# Patient Record
Sex: FEMALE | Race: WHITE | NOT HISPANIC OR LATINO | Employment: UNEMPLOYED | URBAN - METROPOLITAN AREA
[De-identification: names, ages, dates, MRNs, and addresses within clinical notes are randomized per-mention and may not be internally consistent; named-entity substitution may affect disease eponyms.]

---

## 2017-03-14 ENCOUNTER — HOSPITAL ENCOUNTER (EMERGENCY)
Facility: OTHER | Age: 27
Discharge: HOME | End: 2017-03-14
Attending: EMERGENCY MEDICINE
Payer: MEDICAID

## 2017-03-14 VITALS
HEART RATE: 90 BPM | RESPIRATION RATE: 18 BRPM | HEIGHT: 63 IN | TEMPERATURE: 98 F | DIASTOLIC BLOOD PRESSURE: 66 MMHG | BODY MASS INDEX: 25.34 KG/M2 | OXYGEN SATURATION: 98 % | SYSTOLIC BLOOD PRESSURE: 116 MMHG | WEIGHT: 143 LBS

## 2017-03-14 DIAGNOSIS — F41.1 ANXIETY REACTION: Primary | ICD-10-CM

## 2017-03-14 LAB
AMPHET+METHAMPHET UR QL: NEGATIVE
B-HCG UR QL: NEGATIVE
BARBITURATES UR QL SCN>200 NG/ML: NEGATIVE
BENZODIAZ UR QL SCN>200 NG/ML: NEGATIVE
BZE UR QL SCN: NEGATIVE
CANNABINOIDS UR QL SCN: NORMAL
CREAT UR-MCNC: 77.6 MG/DL
CTP QC/QA: YES
ETHANOL UR-MCNC: <10 MG/DL
METHADONE UR QL SCN>300 NG/ML: NEGATIVE
OPIATES UR QL SCN: NEGATIVE
PCP UR QL SCN>25 NG/ML: NEGATIVE
TOXICOLOGY INFORMATION: NORMAL

## 2017-03-14 PROCEDURE — 93010 ELECTROCARDIOGRAM REPORT: CPT | Mod: ,,, | Performed by: INTERNAL MEDICINE

## 2017-03-14 PROCEDURE — 81025 URINE PREGNANCY TEST: CPT | Performed by: PHYSICIAN ASSISTANT

## 2017-03-14 PROCEDURE — 80307 DRUG TEST PRSMV CHEM ANLYZR: CPT

## 2017-03-14 PROCEDURE — 25000003 PHARM REV CODE 250: Performed by: PHYSICIAN ASSISTANT

## 2017-03-14 PROCEDURE — 99284 EMERGENCY DEPT VISIT MOD MDM: CPT | Mod: 25

## 2017-03-14 RX ORDER — FLUOXETINE HYDROCHLORIDE 20 MG/1
40 CAPSULE ORAL DAILY
COMMUNITY
End: 2018-01-26

## 2017-03-14 RX ORDER — LORAZEPAM 0.5 MG/1
0.5 TABLET ORAL
Status: COMPLETED | OUTPATIENT
Start: 2017-03-14 | End: 2017-03-14

## 2017-03-14 RX ORDER — DIVALPROEX SODIUM 500 MG/1
500 TABLET, FILM COATED, EXTENDED RELEASE ORAL DAILY
COMMUNITY
End: 2018-01-26

## 2017-03-14 RX ORDER — GABAPENTIN 600 MG/1
600 TABLET ORAL 3 TIMES DAILY
COMMUNITY
End: 2018-01-26

## 2017-03-14 RX ORDER — BUPROPION HYDROCHLORIDE 150 MG/1
150 TABLET, EXTENDED RELEASE ORAL 2 TIMES DAILY
COMMUNITY
End: 2018-01-26

## 2017-03-14 RX ORDER — MIRTAZAPINE 15 MG/1
30 TABLET, FILM COATED ORAL NIGHTLY
COMMUNITY
End: 2018-01-26

## 2017-03-14 RX ADMIN — LORAZEPAM 0.5 MG: 0.5 TABLET ORAL at 10:03

## 2017-03-14 NOTE — ED AVS SNAPSHOT
[facility]  [address]  [facility] [address]               Kay CLINTON [place]   3/14/2017  9:41 AM   ED    Description:  Female : 1990   Department:  [facility]           Your Care was Coordinated By:     Provider Role From To    Sidney Spivey MD Attending Provider [id number] --    Shante Leon PA-C Physician Assistant 1744 --      Reason for Visit     Anxiety           Diagnoses this Visit        Comments    Anxiety reaction    -  Primary       ED Disposition     None           To Do List           Follow-up Information     Follow up with Ama Hernandes MD In 1 day.    Specialty:  Psychiatry    Why:  To establish outpatient care    Contact information:    [address] [place] [id number] [phone]          Follow up with [facility] In 1 day.    Specialties:  Psychiatry, Behavioral Health    Why:  To establish outpatient care    Contact information:    [address] [facility] [address]  [facility] [address]  [phone]        [facility] Call     [facility] -  Assistance  Registered nurses in the [facility] provide clinical advisement, health education, appointment booking, and other advisory services.  Call for this free service at [phone].             Medications           Message regarding Medications     Verify the changes and/or additions to your medication regime listed below are the same as discussed with your clinician today.  If any of these changes or additions are incorrect, please notify your healthcare provider.        These medications were administered today        Dose Freq    lorazepam tablet 0.5 mg 0.5 mg ED 1 Time    Sig: Take 1 tablet (0.5 mg total) by mouth ED 1 Time.    Class: Normal    Route: Oral      STOP taking these medications     hydrOXYzine HCl (ATARAX) 50 MG tablet Take 2 tablets (100 mg total) by mouth every evening.    citalopram  "(CELEXA) 20 MG tablet Take 1 tablet (20 mg total) by mouth once daily.    divalproex (DEPAKOTE) 500 MG TbEC Take 1 tablet (500 mg total) by mouth every 12 (twelve) hours.    gabapentin (NEURONTIN) 400 MG capsule Take 2 capsules (800 mg total) by mouth 3 (three) times daily.           Verify that the below list of medications is an accurate representation of the medications you are currently taking.  If none reported, the list may be blank. If incorrect, please contact your healthcare provider. Carry this list with you in case of emergency.           Current Medications     buPROPion (WELLBUTRIN SR) 150 MG TBSR 12 hr tablet Take 150 mg by mouth 2 (two) times daily.    divalproex (DEPAKOTE) 500 MG Tb24 Take 500 mg by mouth once daily.    fluoxetine (PROZAC) 20 MG capsule Take 20 mg by mouth once daily.    gabapentin (NEURONTIN) 600 MG tablet Take 600 mg by mouth 3 (three) times daily.    mirtazapine (REMERON) 15 MG tablet Take 15 mg by mouth every evening.           Clinical Reference Information           Your Vitals Were     BP Pulse Temp Resp Height Weight    139/83 (BP Location: Left arm, Patient Position: Sitting) 106 98.3 °F (36.8 °C) (Oral) 22 5' 3" (1.6 m) 64.9 kg (143 lb)    Last Period SpO2 BMI          02/19/2017 (Approximate) 96% 25.33 kg/m2        Allergies as of 3/14/2017        Reactions    Toradol [Ketorolac] Nausea And Vomiting    Naproxen Nausea And Vomiting, Rash      Immunizations Administered on Date of Encounter - 3/14/2017     None      ED Micro, Lab, POCT     Start Ordered       Status Ordering Provider    03/14/17 1004 03/14/17 1003  POCT urine pregnancy  Once      Final result     03/14/17 0951 03/14/17 0952  Toxicology screen, urine  Once      Final result       ED Imaging Orders     None      Discharge References/Attachments     ANXIETY REACTION (ENGLISH)       [facility] complies with applicable Federal civil rights laws and does not discriminate on the basis of race, " color, national origin, age, disability, or sex.        Language Assistance Services     ATTENTION: Language assistance services are available, free of charge. Please call [phone].      ATENCIÓN: Si habla ashok, tiene a simpson disposición servicios gratuitos de asistencia lingüística. Llame al [phone].     CHÚ Ý: N?u b?n nói Ti?ng Vi?t, có các d?ch v? h? tr? ngôn ng? mi?n phí dành cho b?n. G?i s? [phone].

## 2017-03-14 NOTE — ED PROVIDER NOTES
"Encounter Date: 3/14/2017       History     Chief Complaint   Patient presents with    Anxiety     patient was discharged yesterday from [place] and states her anxiety is uncontrollable and states "I feel like I am going crazy and my heart is beating so fast". Denies SI/HI at this time     Review of patient's allergies indicates:   Allergen Reactions    Toradol [ketorolac] Nausea And Vomiting    Naproxen Nausea And Vomiting and Rash     HPI Comments: Patient is 26-year-old female who presents with complaints of anxiety and chest tightness with fast beating heart.  Patient has a history of bipolar disorder, PTSD, anxiety, depression, drug abuse and dependence, hepatitis C. she admits recently being discharged from [place] psychiatric facility yesterday for suicidal ideations.  Currently she adamantly denies suicidal or homicidal ideation but reports having a panic attack upon discharge yesterday.  She reports going to [facility] where she received a Benadryl shot only and was not given any outpatient planning for psychiatric follow-up.  She currently denies having a psychiatrist in [place].  [facility] the [address] and is presenting to the ER today for another panic attack.  She describes anxiousness, shaking, uncontrollable crying, chest tightness.  She denies taking any medications apart from her prescribed medications.  She denies drug or alcohol.  Currently unaccompanied in the ER.  Of note, also denies auditory or visual hallucinations.    The history is provided by the patient.     Past Medical History:   Diagnosis Date    Allergy     Anxiety     Asthma     Bipolar 1 disorder     Cyst of left ovary 2012    pelvic pain    Depression     Drug abuse and dependence     Endometriosis     Epistaxis     rare, happens when she is hot, in humid weather    Headache(784.0)     HEARING LOSS     Hepatitis C     History of psychiatric hospitalization     Lung " disease     Multiple body piercings     nose, ears, lip    Pelvic pain in female     PTSD (post-traumatic stress disorder)     Smoker      Past Surgical History:   Procedure Laterality Date    endometriosis      PELVIC LAPAROSCOPY  02/2011 & 10/2011    VAGINAL DELIVERY      times 2     Family History   Problem Relation Age of Onset    COPD Mother     Hypertension Mother     Ovarian cancer Mother     Cancer Mother      uterine    COPD Father     Hypertension Father     Asthma Sister     Cancer Maternal Grandmother      uterine    Ovarian cancer Maternal Grandmother     Ovarian cancer Paternal Aunt     Breast cancer Neg Hx      Social History   Substance Use Topics    Smoking status: Current Every Day Smoker     Packs/day: 0.50     Years: 10.00     Types: Cigarettes    Smokeless tobacco: Never Used    Alcohol use Yes      Comment: socially     Review of Systems   Constitutional: Negative for chills and fever.   HENT: Negative for sore throat and trouble swallowing.    Eyes: Negative for visual disturbance.   Respiratory: Negative for cough and shortness of breath.    Cardiovascular: Negative for chest pain.   Gastrointestinal: Negative for abdominal pain, constipation, diarrhea, nausea and vomiting.   Genitourinary: Negative for dysuria and flank pain.   Musculoskeletal: Negative for back pain, neck pain and neck stiffness.   Skin: Negative for rash.   Neurological: Negative for dizziness, syncope, weakness and headaches.   Psychiatric/Behavioral: Negative for confusion.        Anxiety         Physical Exam   Initial Vitals   BP Pulse Resp Temp SpO2   03/14/17 0939 03/14/17 0939 03/14/17 0939 03/14/17 0939 03/14/17 0939   139/83 106 22 98.3 °F (36.8 °C) 96 %     Physical Exam    Constitutional: She appears well-developed and well-nourished.   Healthy appearing  female in moderate distress crying uncontrollably during interview and exam.  She is able to be consoled when asking about  history of present illness.  She is very tearful.  She does make good eye contact and speaks in clear full sentences.  She is able to ambulate with ease.  She is noted to have her resting tremor likely related to anxiety.   HENT:   Head: Normocephalic and atraumatic.   Eyes: Conjunctivae and EOM are normal. Pupils are equal, round, and reactive to light. Right eye exhibits no discharge. Left eye exhibits no discharge. No scleral icterus.   Neck: Normal range of motion. Neck supple.   Cardiovascular: Normal rate, regular rhythm and normal heart sounds. Exam reveals no gallop and no friction rub.    No murmur heard.  Pulmonary/Chest: Breath sounds normal. She has no wheezes. She has no rhonchi. She has no rales.   Abdominal: Soft. Bowel sounds are normal. There is no tenderness. There is no rebound and no guarding.   Musculoskeletal: Normal range of motion. She exhibits no edema or tenderness.   Lymphadenopathy:     She has no cervical adenopathy.   Neurological: She is alert and oriented to person, place, and time. She has normal strength. No cranial nerve deficit or sensory deficit.   Skin: Skin is warm and dry. No rash and no abscess noted. No erythema.   Skin is flushed she is not diaphoretic   Psychiatric: She has a normal mood and affect. Her behavior is normal. Thought content normal.         ED Course   Procedures  Labs Reviewed   TOXICOLOGY SCREEN, URINE, RANDOM (COMPLIANCE)     EKG Readings: (Independently Interpreted)   Previous EKG: Compared with most recent EKG Rhythm: Normal Sinus Rhythm. Heart Rate: 96. ST Segments: Normal ST Segments. T Waves: Normal. Axis: Normal.          Medical Decision Making:   ED Management:  Urgent evaluation a 26-year-old female who presents with complaints of anxiety.  Patient is afebrile, nontoxic appearing, hemodynamically stable.  Physical exam outlined above and reveals mild tachycardia, flushed skin, resting tremor and very tearful patient.  She does have dilated pupils  "which is concerning for drug intoxication.  UDS pending.  EKG pending.  We'll reevaluate.  The patient is NOT gravely disabled at this time no PEC felt warranted.    UDS is only trace positive for THC, EKG has NSR with HR of 96 BPM will give single dose of ativan here in the ED and discharge with communicty resources for outpatient psychiatric follow-up.     UPDATE: patient reports feeling much better and clinically feels much "calmer". She is felt safe for discharge with strict instruction to follow-up with outpatient psychiatry ASAP. She is amenable. Case discussed with attending who agrees with plan.                    ED Course     Clinical Impression:   The encounter diagnosis was Anxiety reaction.          Shante Leon PA-C  03/14/17 1111    "

## 2017-03-14 NOTE — ED TRIAGE NOTES
Pt discharged from [place] yesterday, admitted for SI. Pt denies SI at present. Pt c/o increased anxiety. She reports that her ex  has not allowed her to visit her children & this is a great source of stress. Pt reports that she has been taking prozac & wellbutrin with no relief anxiety relief. Pt crying, reports that she has been using breathing techniques in addition to medications with no relief. Pt went to [facility] yesterday & given a benadryl shot which provided minimal relief. Pt reports that she has minimal resources & support. [facility].

## 2017-03-14 NOTE — ED NOTES
Patient Identifiers for Kay CLINTON [place] checked and correct  LOC: The patient is awake, alert and aware of environment with an appropriate affect, the patient is oriented x 3 and speaking appropriate.  APPEARANCE: Pt upset, anxious & crying. Pt is in no acute distress. The patient is clean and well groomed. The patient's clothing is properly fastened.  SKIN: The skin is warm and dry. The patient has normal skin turgor and moist mucus membranes. No rashes or lesions upon observation. Skin Intact , no breakdown noted.  Musculoskeletal :  Normal range of motion noted. Moves all extremities well.  RESPIRATORY: Airway is open and patent, respirations are spontaneous, patient has a normal effort and rate. Breath sounds are clear & equal, bilaterally.  CARDIAC: Patient has a normal rate and rhythm, no peripheral edema noted, capillary refill < 3 seconds.   PULSES: 2+ radial pulses, symmetrical.    Will continue to monitor

## 2018-01-26 PROBLEM — F19.11 HISTORY OF SUBSTANCE ABUSE: Status: ACTIVE | Noted: 2018-01-26

## 2018-01-26 PROBLEM — Z72.0 TOBACCO ABUSE: Status: ACTIVE | Noted: 2018-01-26

## 2018-01-30 PROBLEM — B19.20 HEPATITIS C: Status: ACTIVE | Noted: 2018-01-30

## 2018-03-04 ENCOUNTER — NURSE TRIAGE (OUTPATIENT)
Dept: ADMINISTRATIVE | Facility: CLINIC | Age: 28
End: 2018-03-04

## 2018-03-04 NOTE — TELEPHONE ENCOUNTER
"  Reason for Disposition   MODERATE-SEVERE abdominal pain (e.g., interferes with normal activities, awakens from sleep)    Answer Assessment - Initial Assessment Questions  1. LOCATION: "Where does it hurt?"       Lower back and stomac  2. RADIATION: "Does the pain shoot anywhere else?" (e.g., chest, back, shoulder)      Down legs  3. ONSET: "When did the pain begin?" (e.g., minutes, hours or days ago)       Not to long ago  4. ONSET: "Gradual or sudden onset?"      30 minutes ago  5. PATTERN "Does the pain come and go, or has it been constant since it started?"       steady  6. SEVERITY: "How bad is the pain?" "What does it keep you from doing?"  (e.g., Scale 1-10; mild, moderate, or severe)    - MILD (1-3): doesn't interfere with normal activities, abdomen soft and not tender to touch     - MODERATE (4-7): interferes with normal activities or awakens from sleep, tender to touch     - SEVERE (8-10): excruciating pain, doubled over, unable to do any normal activities      6-7  7. RECURRENT SYMPTOM: "Have you ever had this type of abdominal pain before?" If so, ask: "When was the last time?" and "What happened that time?"       Yes with previous pregn  8. CAUSE: "What do you think is causing the abdominal pain?"      permature labor  9. RELIEVING/AGGRAVATING FACTORS: "What makes it better or worse?" (e.g., antacids, bowel movement, movement)      no  10. OTHER SYMPTOMS: "Has there been any vaginal bleeding, fever, vomiting, diarrhea, or urine problems?"        nauseated  11. YOGI: "What date are you expecting to deliver?"        911    Protocols used: ST PREGNANCY - ABDOMINAL PAIN LESS THAN 20 WEEKS EGA-A-    "

## 2018-04-30 ENCOUNTER — HOSPITAL ENCOUNTER (OUTPATIENT)
Facility: HOSPITAL | Age: 28
Discharge: HOME | End: 2018-05-02
Attending: EMERGENCY MEDICINE | Admitting: SURGERY
Payer: MEDICAID

## 2018-04-30 ENCOUNTER — NURSE TRIAGE (OUTPATIENT)
Dept: ADMINISTRATIVE | Facility: CLINIC | Age: 28
End: 2018-04-30

## 2018-04-30 DIAGNOSIS — R10.9 RIGHT SIDED ABDOMINAL PAIN: Primary | ICD-10-CM

## 2018-04-30 DIAGNOSIS — R10.84 GENERALIZED ABDOMINAL PAIN: ICD-10-CM

## 2018-04-30 DIAGNOSIS — R11.2 NON-INTRACTABLE VOMITING WITH NAUSEA, UNSPECIFIED VOMITING TYPE: ICD-10-CM

## 2018-04-30 DIAGNOSIS — Z3A.19 19 WEEKS GESTATION OF PREGNANCY: ICD-10-CM

## 2018-04-30 PROCEDURE — 81003 URINALYSIS AUTO W/O SCOPE: CPT

## 2018-04-30 PROCEDURE — 99285 EMERGENCY DEPT VISIT HI MDM: CPT | Mod: 25

## 2018-04-30 PROCEDURE — 25000003 PHARM REV CODE 250: Performed by: PHYSICIAN ASSISTANT

## 2018-04-30 RX ORDER — ACETAMINOPHEN 325 MG/1
325 TABLET ORAL
Status: COMPLETED | OUTPATIENT
Start: 2018-04-30 | End: 2018-04-30

## 2018-04-30 RX ORDER — ONDANSETRON 4 MG/1
4 TABLET, ORALLY DISINTEGRATING ORAL
Status: COMPLETED | OUTPATIENT
Start: 2018-04-30 | End: 2018-04-30

## 2018-04-30 RX ADMIN — ONDANSETRON 4 MG: 4 TABLET, ORALLY DISINTEGRATING ORAL at 11:04

## 2018-04-30 RX ADMIN — ACETAMINOPHEN 325 MG: 325 TABLET ORAL at 11:04

## 2018-05-01 PROBLEM — R10.9 RIGHT SIDED ABDOMINAL PAIN: Status: ACTIVE | Noted: 2018-05-01

## 2018-05-01 LAB
ALBUMIN SERPL BCP-MCNC: 3.3 G/DL
ALP SERPL-CCNC: 60 U/L
ALT SERPL W/O P-5'-P-CCNC: 41 U/L
ANION GAP SERPL CALC-SCNC: 9 MMOL/L
AST SERPL-CCNC: 35 U/L
BASOPHILS # BLD AUTO: 0.02 K/UL
BASOPHILS NFR BLD: 0.2 %
BILIRUB SERPL-MCNC: 0.3 MG/DL
BILIRUB UR QL STRIP: NEGATIVE
BUN SERPL-MCNC: 7 MG/DL
CALCIUM SERPL-MCNC: 9.2 MG/DL
CHLORIDE SERPL-SCNC: 106 MMOL/L
CLARITY UR: CLEAR
CO2 SERPL-SCNC: 20 MMOL/L
COLOR UR: NORMAL
CREAT SERPL-MCNC: 0.7 MG/DL
DIFFERENTIAL METHOD: ABNORMAL
EOSINOPHIL # BLD AUTO: 0.2 K/UL
EOSINOPHIL NFR BLD: 1.3 %
ERYTHROCYTE [DISTWIDTH] IN BLOOD BY AUTOMATED COUNT: 13.4 %
EST. GFR  (AFRICAN AMERICAN): >60 ML/MIN/1.73 M^2
EST. GFR  (NON AFRICAN AMERICAN): >60 ML/MIN/1.73 M^2
GLUCOSE SERPL-MCNC: 90 MG/DL
GLUCOSE UR QL STRIP: NEGATIVE
HCT VFR BLD AUTO: 38.2 %
HGB BLD-MCNC: 13.2 G/DL
HGB UR QL STRIP: NEGATIVE
KETONES UR QL STRIP: NEGATIVE
LEUKOCYTE ESTERASE UR QL STRIP: NEGATIVE
LIPASE SERPL-CCNC: 16 U/L
LYMPHOCYTES # BLD AUTO: 3.1 K/UL
LYMPHOCYTES NFR BLD: 24.7 %
MCH RBC QN AUTO: 32 PG
MCHC RBC AUTO-ENTMCNC: 34.6 G/DL
MCV RBC AUTO: 93 FL
MONOCYTES # BLD AUTO: 0.9 K/UL
MONOCYTES NFR BLD: 6.9 %
NEUTROPHILS # BLD AUTO: 8.5 K/UL
NEUTROPHILS NFR BLD: 66.9 %
NITRITE UR QL STRIP: NEGATIVE
PH UR STRIP: 6 [PH] (ref 5–8)
PLATELET # BLD AUTO: 294 K/UL
PMV BLD AUTO: 10.8 FL
POTASSIUM SERPL-SCNC: 3.4 MMOL/L
PROT SERPL-MCNC: 6.5 G/DL
PROT UR QL STRIP: NEGATIVE
RBC # BLD AUTO: 4.13 M/UL
SODIUM SERPL-SCNC: 135 MMOL/L
SP GR UR STRIP: 1 (ref 1–1.03)
URN SPEC COLLECT METH UR: NORMAL
UROBILINOGEN UR STRIP-ACNC: NEGATIVE EU/DL
WBC # BLD AUTO: 12.66 K/UL

## 2018-05-01 PROCEDURE — 63600175 PHARM REV CODE 636 W HCPCS: Performed by: PHYSICIAN ASSISTANT

## 2018-05-01 PROCEDURE — G0378 HOSPITAL OBSERVATION PER HR: HCPCS

## 2018-05-01 PROCEDURE — 25000003 PHARM REV CODE 250: Performed by: PHYSICIAN ASSISTANT

## 2018-05-01 PROCEDURE — 96374 THER/PROPH/DIAG INJ IV PUSH: CPT

## 2018-05-01 PROCEDURE — 85025 COMPLETE CBC W/AUTO DIFF WBC: CPT

## 2018-05-01 PROCEDURE — 83690 ASSAY OF LIPASE: CPT

## 2018-05-01 PROCEDURE — 80053 COMPREHEN METABOLIC PANEL: CPT

## 2018-05-01 PROCEDURE — 25000003 PHARM REV CODE 250: Performed by: SURGERY

## 2018-05-01 PROCEDURE — 63600175 PHARM REV CODE 636 W HCPCS: Performed by: SURGERY

## 2018-05-01 RX ORDER — HYDROCODONE BITARTRATE AND ACETAMINOPHEN 5; 325 MG/1; MG/1
1 TABLET ORAL EVERY 4 HOURS PRN
Status: DISCONTINUED | OUTPATIENT
Start: 2018-05-01 | End: 2018-05-02 | Stop reason: HOSPADM

## 2018-05-01 RX ORDER — HYDROCODONE BITARTRATE AND ACETAMINOPHEN 5; 325 MG/1; MG/1
1 TABLET ORAL
Status: COMPLETED | OUTPATIENT
Start: 2018-05-01 | End: 2018-05-01

## 2018-05-01 RX ORDER — ONDANSETRON 4 MG/1
4 TABLET, ORALLY DISINTEGRATING ORAL EVERY 6 HOURS PRN
Status: DISCONTINUED | OUTPATIENT
Start: 2018-05-01 | End: 2018-05-02 | Stop reason: HOSPADM

## 2018-05-01 RX ORDER — PRENATAL WITH FERROUS FUM AND FOLIC ACID 3080; 920; 120; 400; 22; 1.84; 3; 20; 10; 1; 12; 200; 27; 25; 2 [IU]/1; [IU]/1; MG/1; [IU]/1; MG/1; MG/1; MG/1; MG/1; MG/1; MG/1; UG/1; MG/1; MG/1; MG/1; MG/1
1 TABLET ORAL DAILY
Status: DISCONTINUED | OUTPATIENT
Start: 2018-05-01 | End: 2018-05-02 | Stop reason: HOSPADM

## 2018-05-01 RX ORDER — METOCLOPRAMIDE HYDROCHLORIDE 5 MG/ML
10 INJECTION INTRAMUSCULAR; INTRAVENOUS
Status: COMPLETED | OUTPATIENT
Start: 2018-05-01 | End: 2018-05-01

## 2018-05-01 RX ORDER — ACETAMINOPHEN 325 MG/1
650 TABLET ORAL EVERY 8 HOURS PRN
Status: DISCONTINUED | OUTPATIENT
Start: 2018-05-01 | End: 2018-05-02 | Stop reason: HOSPADM

## 2018-05-01 RX ORDER — SODIUM CHLORIDE 9 MG/ML
1000 INJECTION, SOLUTION INTRAVENOUS
Status: COMPLETED | OUTPATIENT
Start: 2018-05-01 | End: 2018-05-01

## 2018-05-01 RX ADMIN — METOCLOPRAMIDE 10 MG: 5 INJECTION, SOLUTION INTRAMUSCULAR; INTRAVENOUS at 01:05

## 2018-05-01 RX ADMIN — HYDROCODONE BITARTRATE AND ACETAMINOPHEN 1 TABLET: 5; 325 TABLET ORAL at 02:05

## 2018-05-01 RX ADMIN — PIPERACILLIN AND TAZOBACTAM 4.5 G: 4; .5 INJECTION, POWDER, LYOPHILIZED, FOR SOLUTION INTRAVENOUS; PARENTERAL at 10:05

## 2018-05-01 RX ADMIN — HYDROCODONE BITARTRATE AND ACETAMINOPHEN 1 TABLET: 5; 325 TABLET ORAL at 08:05

## 2018-05-01 RX ADMIN — HYDROCODONE BITARTRATE AND ACETAMINOPHEN 1 TABLET: 5; 325 TABLET ORAL at 03:05

## 2018-05-01 RX ADMIN — SODIUM CHLORIDE 1000 ML: 0.9 INJECTION, SOLUTION INTRAVENOUS at 05:05

## 2018-05-01 RX ADMIN — HYDROCODONE BITARTRATE AND ACETAMINOPHEN 1 TABLET: 5; 325 TABLET ORAL at 07:05

## 2018-05-01 RX ADMIN — PROMETHAZINE HYDROCHLORIDE 6.25 MG: 25 INJECTION INTRAMUSCULAR; INTRAVENOUS at 08:05

## 2018-05-01 RX ADMIN — ONDANSETRON 4 MG: 4 TABLET, ORALLY DISINTEGRATING ORAL at 03:05

## 2018-05-01 RX ADMIN — PIPERACILLIN AND TAZOBACTAM 4.5 G: 4; .5 INJECTION, POWDER, LYOPHILIZED, FOR SOLUTION INTRAVENOUS; PARENTERAL at 03:05

## 2018-05-01 RX ADMIN — ONDANSETRON 4 MG: 4 TABLET, ORALLY DISINTEGRATING ORAL at 07:05

## 2018-05-01 NOTE — ED TRIAGE NOTES
Arrived to ED per EMS with c/o RUQ abd pain that radiates into side and under right shoulder blade x 2 days that has progressively became worse.  Also, c/o diarrhea x 2 months.  Reports that she is 19 weeks and 3 days pregnant with expected date of delivery 09/21/2018.  Reports that she also has a vaginal discharge of brown mucus like discharge.  Last OB appointment was 04/17/2018 and reported as normal

## 2018-05-01 NOTE — CONSULTS
"History and Physical - Obstetrics    Subjective:     Ms Berger is a 27 y.o.  @ 19w4d gestational age with an Estimated Date of Delivery: 18, who was admitted overnight for right sided lower abdominal pain that radiated to her back.      This AM she reports that her pain is the same.  Additionally, she has a new onset nausea with emesis (last yesterday PM).  Lastly, prior to pregnancy she has been having loose stools that she now reports are "black."    Her current obstetrical history is significant for h/o PSA (sober now with no replacement during pregnancy), Hep C, Depression/anxiety/PTSD on Prozac, mild intermittent asthma, h/o unclear seizure d/o and tobacco use.    Review of Systems  Nine system ROS negative except for HPI    PTA Medications   Medication Sig    albuterol (PROVENTIL) 2.5 mg /3 mL (0.083 %) nebulizer solution Take 3 mLs (2.5 mg total) by nebulization every 6 (six) hours as needed for Wheezing. Rescue    FLUoxetine (PROZAC) 40 MG capsule Take 1 capsule (40 mg total) by mouth once daily.    omeprazole (PRILOSEC) 20 MG capsule Take 1 capsule (20 mg total) by mouth once daily.    ondansetron (ZOFRAN ODT) 4 MG TbDL Take 1 tablet (4 mg total) by mouth every 6 (six) hours as needed.    prenatal vit 10-iron fum-folic 65-1 mg Tab Take 1 tablet by mouth once daily.    doxylamine succinate (UNISOM, DOXYLAMINE,) 25 mg tablet Take 1 tablet (25 mg total) by mouth nightly as needed for Insomnia.    GINGER ROOT (GINGER, ZINGIBER OFFICINALIS,) 550 mg Cap Take 1 capsule by mouth once daily.    pyridoxine, vitamin B6, (B-6) 25 MG Tab Take 1 tablet (25 mg total) by mouth once daily.       Review of patient's allergies indicates:   Allergen Reactions    Toradol [ketorolac] Nausea And Vomiting    Naproxen Nausea And Vomiting and Rash        Past Medical History:   Diagnosis Date    Allergy     Anxiety     Asthma     Bipolar 1 disorder     Cyst of left ovary     pelvic pain    Depression "     Drug abuse and dependence     Endometriosis     Epistaxis     rare, happens when she is hot, in humid weather    Headache(784.0)     HEARING LOSS     Hepatitis C     History of psychiatric hospitalization     Lung disease     Migraine headache     Multiple body piercings     nose, ears, lip    Pelvic pain in female     PTSD (post-traumatic stress disorder)     Smoker        Past Surgical History:   Procedure Laterality Date    endometriosis      PELVIC LAPAROSCOPY  2011 & 10/2011    VAGINAL DELIVERY      times 2       OB History      Para Term  AB Living    3 2 2     2    SAB TAB Ectopic Multiple Live Births            2          Social History     Social History    Marital status:      Spouse name: N/A    Number of children: N/A    Years of education: N/A     Occupational History    Not on file.     Social History Main Topics    Smoking status: Current Every Day Smoker     Packs/day: 0.10     Years: 10.00     Types: Cigarettes    Smokeless tobacco: Never Used    Alcohol use No    Drug use: No      Comment: pt states she has been clean for 2 months    Sexual activity: Yes     Partners: Male     Other Topics Concern    Not on file     Social History Narrative    No narrative on file       Family History   Problem Relation Age of Onset    COPD Mother     Hypertension Mother     Ovarian cancer Mother     Cancer Mother      uterine    COPD Father     Hypertension Father     Asthma Sister     Cancer Maternal Grandmother      uterine    Ovarian cancer Maternal Grandmother     Ovarian cancer Paternal Aunt     Breast cancer Neg Hx        Objective:   Vital Signs (Most Recent)  Temp: 97.8 °F (36.6 °C) (18)  Pulse: 81 (18)  Resp: 16 (18)  BP: 135/72 (18)  SpO2: 100 % (18)  Fetal Heart Tones: not yet recorded today (yesterday 140 bpm)    General: no acute distress, alert and oriented to person, place and  time, nontoxic appearing  Abdomen: gravid, no palpable contractions, no rebound or guarding, tenderness in the RLQ, no CVA tenderness  Extremities: calves non-tender to palpation, no calf edema, erythema or palpable cords  Cervix: close, long, thick, normal discharge no blood, etc    Laboratory: see results console    Assessment:     28 yo  @ 19w4d gestational age with RLQ pain radiating to her back with N/V, and loose stools    Plan:     MRI pending today  OB/GYN etiology also on the differential besides suspected appendicitis is ovarian cyst/torsion; PID less likely with this late gestation; Kidney stones also a possibility; cervix long and closed with no contractions -  labor less likely; with complaint of black stool can consider FOBT +/- ova/parasite, etc doesn't sound like C. Diff; labs overall look normal  Thank you for the consult - will continue to follow

## 2018-05-01 NOTE — NURSING
Patient arrived to floor transported via wheelchair, AAO x4, not in acute distress. Placed comfortably on bed. Instructed to use call light for assistance.

## 2018-05-01 NOTE — PROGRESS NOTES
"TN reviewed "GI discharge instructions" handout with patient using teach back.  Patient is in agreement and verbalized an understanding. Placed discharge information in blue discharge folder.  TN also reviewed patient responsibility checklist with her using teach back. Patient was able to verbalize her responsibilities after discharge to manage her care at home bein. Going to follow up appointments   2. Picking up rx from the pharmacy when discharged  3. Taking her medications as prescribed       "

## 2018-05-01 NOTE — ED PROVIDER NOTES
Encounter Date: 2018    SCRIBE #1 NOTE: I, Guillermo Canas, am scribing for, and in the presence of,  Esvin Joy PA-C. I have scribed the following portions of the note - Other sections scribed: HPI/ROS.       History     Chief Complaint   Patient presents with    Flank Pain     right sided, with cramping, denies vaginal bleeding, pt 19 weeks pregnant     Abdominal Pain     with diarrhea x 2 months      CC: Abdominal Pain     HPI: This 27 y.o. Female A1 who is 19w3d gestation presents to the ED for an evaluation of acute onset, moderate (7/10) pain to the R, lateral aspect of abdomen that radiates to her lower back that began today. There is associated nausea and vomiting. Patient also reports brown, mucus-like vaginal discharge that began today as well. No alleviating or exacerbating factors. She attempted tx with Tylenol noting last dose was about 5-6 hours ago. Her OB-GYN is Dr. Reeder. Otherwise, patient denies fever, chills, numbness, weakness, chest pain, and SOB.      The history is provided by the patient. No  was used.     Review of patient's allergies indicates:   Allergen Reactions    Toradol [ketorolac] Nausea And Vomiting    Naproxen Nausea And Vomiting and Rash     Past Medical History:   Diagnosis Date    Allergy     Anxiety     Asthma     Bipolar 1 disorder     Cyst of left ovary     pelvic pain    Depression     Drug abuse and dependence     Endometriosis     Epistaxis     rare, happens when she is hot, in humid weather    Headache(784.0)     HEARING LOSS     Hepatitis C     History of psychiatric hospitalization     Lung disease     Migraine headache     Multiple body piercings     nose, ears, lip    Pelvic pain in female     PTSD (post-traumatic stress disorder)     Smoker      Past Surgical History:   Procedure Laterality Date    endometriosis      PELVIC LAPAROSCOPY  2011 & 10/2011    VAGINAL DELIVERY      times 2     Family History    Problem Relation Age of Onset    COPD Mother     Hypertension Mother     Ovarian cancer Mother     Cancer Mother      uterine    COPD Father     Hypertension Father     Asthma Sister     Cancer Maternal Grandmother      uterine    Ovarian cancer Maternal Grandmother     Ovarian cancer Paternal Aunt     Breast cancer Neg Hx      Social History   Substance Use Topics    Smoking status: Current Every Day Smoker     Packs/day: 0.10     Years: 10.00     Types: Cigarettes    Smokeless tobacco: Never Used    Alcohol use No     Review of Systems   Constitutional: Negative for chills and fever.   HENT: Negative for congestion, ear pain, rhinorrhea and sore throat.    Eyes: Negative for pain and visual disturbance.   Respiratory: Negative for cough and shortness of breath.    Cardiovascular: Negative for chest pain.   Gastrointestinal: Positive for abdominal pain. Negative for diarrhea, nausea and vomiting.   Genitourinary: Positive for vaginal discharge. Negative for dysuria.   Musculoskeletal: Positive for back pain. Negative for neck pain.   Skin: Negative for rash.   Neurological: Negative for weakness, numbness and headaches.       Physical Exam     Initial Vitals [04/30/18 2317]   BP Pulse Resp Temp SpO2   135/84 84 16 -- 98 %      MAP       101         Physical Exam    Vitals reviewed.  Constitutional: She appears well-developed and well-nourished. She is not diaphoretic. No distress.   HENT:   Head: Normocephalic and atraumatic.   Right Ear: External ear normal.   Left Ear: External ear normal.   Nose: Nose normal.   Eyes: Conjunctivae are normal. No scleral icterus.   Neck: Normal range of motion. Neck supple.   Cardiovascular: Normal rate, regular rhythm, normal heart sounds and intact distal pulses.   Pulmonary/Chest: Breath sounds normal. No respiratory distress. She has no wheezes. She has no rhonchi. She has no rales. She exhibits no tenderness.   Abdominal: Soft. She exhibits no distension and  no mass. There is tenderness in the right upper quadrant and right lower quadrant. There is no rigidity, no rebound, no guarding and no CVA tenderness.   Gravid abdomen, appears larger than gestational age. Fundal height approximately at umbilicus   Musculoskeletal: Normal range of motion.   Neurological: She is alert and oriented to person, place, and time.   Skin: Skin is warm and dry.         ED Course   Procedures  Labs Reviewed   CBC W/ AUTO DIFFERENTIAL - Abnormal; Notable for the following:        Result Value    MCH 32.0 (*)     Gran # (ANC) 8.5 (*)     All other components within normal limits   COMPREHENSIVE METABOLIC PANEL - Abnormal; Notable for the following:     Sodium 135 (*)     Potassium 3.4 (*)     CO2 20 (*)     Albumin 3.3 (*)     All other components within normal limits   URINALYSIS          X-Rays:   Independently Interpreted Readings:   Other Readings:  US of right side abdomen shows no evidence of cholecystitis, cholelithiasis, hydronephrosis, urolithiasis.  Appendix not visualized.    Medical Decision Making:   Initial Assessment:   27-year-old pregnant female, 19 weeks and 3 days, a  presents with right-sided abdominal pain radiating to right flank x2 days with nausea vomiting. She denies fever, vaginal bleeding, leakage of fluids, dysuria.  She presents in obvious discomfort, nontoxic-appearing, afebrile, with vital signs within normal limits. Abdomen tender to the right lower quadrant, right upper quadrant, mild right flank tenderness. Bedside ultrasound shows intrauterine pregnancy with fetal movement and fetal heart rate of 140.  ED Management:  Labs obtained showed no leukocytosis.  Lipase, LFTs within normal limits. UA without evidence for infection.  The right upper quadrant and right lower quadrant ultrasound obtained shows no cholecystitis, cholelithiasis, hydronephrosis.  Appendix not identified.  Unable to rule out appendicitis in this pregnant patient.  General surgery,   Tristan, consulted. Will place patient in observation for rule out appendicitis with MRI this morning.  OBGYN also contacted and will follow.  Patient's pain and nausea controlled.  Plan discussed with patient and she verbalized understanding and agrees.  Case also discussed with Dr. James who also evaluated this patient.            Scribe Attestation:   Scribe #1: I performed the above scribed service and the documentation accurately describes the services I performed. I attest to the accuracy of the note.    Attending Attestation:           Physician Attestation for Scribe:  Physician Attestation Statement for Scribe #1: I, Esvin Joy PA-C, reviewed documentation, as scribed by Guillermo Canas in my presence, and it is both accurate and complete.                    Clinical Impression:   The primary encounter diagnosis was Right sided abdominal pain. Diagnoses of Non-intractable vomiting with nausea, unspecified vomiting type and 19 weeks gestation of pregnancy were also pertinent to this visit.                           Esvin Joy PA-C  05/01/18 0512

## 2018-05-01 NOTE — PLAN OF CARE
"TN met with patient at bedside to complete discharge needs assessment. TN explained duties of case management to patient.  TN reviewed and provided  "[facility] Packet", "Discharge Planning Begins on Admission" brochure and discussed "Help at Home".  Patient stated that her mother in law Mago is her HELP AT HOME. TN also discussed her responsibilities to manage her health at home. Patient was informed to leave folder at bedside during hospital stay. Contact information added to white board.b    PCP= Amna Price  OBGYN= Dr. Reeder    [place]   [facility] [place] [address]  [address]  [place] [address]  Phone: [phone] Fax: [phone]      Appointment Time Preference: mornings       05/01/18 1215   Discharge Assessment   Assessment Type Discharge Planning Assessment   Assessment information obtained from? Patient   Prior to hospitilization cognitive status: Alert/Oriented   Prior to hospitalization functional status: Independent   Current cognitive status: Alert/Oriented   Current Functional Status: Independent   Lives With other (see comments)  (Mother in law- Mago )   Able to Return to Prior Arrangements yes   Is patient able to care for self after discharge? Yes   Who are your caregiver(s) and their phone number(s)? Mago (mother in law) @ [phone]   Patient's perception of discharge disposition home or selfcare   Readmission Within The Last 30 Days no previous admission in last 30 days   Patient currently being followed by outpatient case management? No   Patient currently receives any other outside agency services? No   Equipment Currently Used at Home none   Do you have any problems affording any of your prescribed medications? No   Is the patient taking medications as prescribed? yes   Does the patient have transportation home? Yes   Transportation Available Medicaid transportation   Does the patient receive services at the " [facility]? No   Discharge Plan A Home with family;Home   Discharge Plan B Home with family;Home   Patient/Family In Agreement With Plan yes

## 2018-05-01 NOTE — PROGRESS NOTES
Recalled for fetal heart tones to be done. Nurse states she is aware and will come when doppler is found.

## 2018-05-01 NOTE — H&P
[facility]  General Surgery Admission Report  2018 4:02 PM     Kay CLINTON [place]  [id number]    CC: abdominal pain    HPI:  27 y.o. female  19+4 weeks pregnant who presents with RLQ pain. She reports pain in the RLQ for the past 4 days that suddenly worsened last night. She has associated nausea and emesis without hematemesis. She also reports diarrhea for the past few months. She denies fever, chills, constipation, melena, hematochezia.     Past Medical History:   Diagnosis Date    Allergy     Anxiety     Asthma     Bipolar 1 disorder     Cyst of left ovary     pelvic pain    Depression     Drug abuse and dependence     Endometriosis     Epistaxis     rare, happens when she is hot, in humid weather    Headache(784.0)     HEARING LOSS     Hepatitis C     History of psychiatric hospitalization     Lung disease     Migraine headache     Multiple body piercings     nose, ears, lip    Pelvic pain in female     PTSD (post-traumatic stress disorder)     Smoker        Past Surgical History:   Procedure Laterality Date    endometriosis      PELVIC LAPAROSCOPY  2011 & 10/2011    VAGINAL DELIVERY      times 2       Social History     Social History    Marital status:      Spouse name: N/A    Number of children: N/A    Years of education: N/A     Occupational History    Not on file.     Social History Main Topics    Smoking status: Current Every Day Smoker     Packs/day: 0.10     Years: 10.00     Types: Cigarettes    Smokeless tobacco: Never Used    Alcohol use No    Drug use: No      Comment: pt states she has been clean for 2 months    Sexual activity: Yes     Partners: Male     Other Topics Concern    Not on file     Social History Narrative    No narrative on file       Family History   Problem Relation Age of Onset    COPD Mother     Hypertension Mother     Ovarian cancer Mother     Cancer Mother      uterine    COPD Father     Hypertension Father     Asthma  Sister     Cancer Maternal Grandmother      uterine    Ovarian cancer Maternal Grandmother     Ovarian cancer Paternal Aunt     Breast cancer Neg Hx        Review of patient's allergies indicates:   Allergen Reactions    Toradol [ketorolac] Nausea And Vomiting    Naproxen Nausea And Vomiting and Rash       ROS - as per HPI otherwise complete ROS negative    Objective    Vitals:    05/01/18 1536   BP: (!) 102/51   Pulse: 83   Resp: 16   Temp: 98.4 °F (36.9 °C)     GEN: Awake, alert, resting comfortable in bed   HEENT: NCAT  RESP: Normal WOB, no distress  CV: RR  ABD: Soft, TTP right mid abdomen, no guarding/rebound  EXT: WWP, no edema  SKIN: warm, dry  NEURO: alert, normal speech  PSYCH: normal mood and affect      A/P: 27 y.o. female with above history and findings. Pain improved overnight.   - PO trial today  - if tolerates, plan to discharge home, will confirm this is ok with ob/gyn    AMADO Hudson MD  [facility] Surgery PGY-III

## 2018-05-01 NOTE — PROGRESS NOTES
TN attempted to arrange OB follow up appointment with Dion Reeder MD. Appt already scheduled for Monday, 5/7 @ 9:30am. Spoke with Nedra. PCP appt scheduled with Dr. Price on Tuesday, 5/8 @ 1:30pm. Spoke with Cinthia.

## 2018-05-02 VITALS
HEART RATE: 81 BPM | RESPIRATION RATE: 17 BRPM | HEIGHT: 63 IN | OXYGEN SATURATION: 97 % | WEIGHT: 192.44 LBS | TEMPERATURE: 98 F | DIASTOLIC BLOOD PRESSURE: 56 MMHG | SYSTOLIC BLOOD PRESSURE: 116 MMHG | BODY MASS INDEX: 34.1 KG/M2

## 2018-05-02 LAB
ANION GAP SERPL CALC-SCNC: 7 MMOL/L
BASOPHILS # BLD AUTO: 0.04 K/UL
BASOPHILS NFR BLD: 0.5 %
BUN SERPL-MCNC: 10 MG/DL
CALCIUM SERPL-MCNC: 8.7 MG/DL
CHLORIDE SERPL-SCNC: 108 MMOL/L
CO2 SERPL-SCNC: 24 MMOL/L
CREAT SERPL-MCNC: 0.7 MG/DL
DIFFERENTIAL METHOD: ABNORMAL
EOSINOPHIL # BLD AUTO: 0.2 K/UL
EOSINOPHIL NFR BLD: 2.8 %
ERYTHROCYTE [DISTWIDTH] IN BLOOD BY AUTOMATED COUNT: 13.7 %
EST. GFR  (AFRICAN AMERICAN): >60 ML/MIN/1.73 M^2
EST. GFR  (NON AFRICAN AMERICAN): >60 ML/MIN/1.73 M^2
GLUCOSE SERPL-MCNC: 83 MG/DL
HCT VFR BLD AUTO: 36.3 %
HGB BLD-MCNC: 12.3 G/DL
LYMPHOCYTES # BLD AUTO: 2.8 K/UL
LYMPHOCYTES NFR BLD: 34.7 %
MCH RBC QN AUTO: 31.6 PG
MCHC RBC AUTO-ENTMCNC: 33.9 G/DL
MCV RBC AUTO: 93 FL
MONOCYTES # BLD AUTO: 0.8 K/UL
MONOCYTES NFR BLD: 10 %
NEUTROPHILS # BLD AUTO: 4.1 K/UL
NEUTROPHILS NFR BLD: 51.6 %
PLATELET # BLD AUTO: 277 K/UL
PMV BLD AUTO: 11.4 FL
POTASSIUM SERPL-SCNC: 4 MMOL/L
RBC # BLD AUTO: 3.89 M/UL
SODIUM SERPL-SCNC: 139 MMOL/L
WBC # BLD AUTO: 7.99 K/UL

## 2018-05-02 PROCEDURE — 63600175 PHARM REV CODE 636 W HCPCS: Performed by: SURGERY

## 2018-05-02 PROCEDURE — 25000003 PHARM REV CODE 250: Performed by: PHYSICIAN ASSISTANT

## 2018-05-02 PROCEDURE — G0378 HOSPITAL OBSERVATION PER HR: HCPCS

## 2018-05-02 PROCEDURE — 85025 COMPLETE CBC W/AUTO DIFF WBC: CPT

## 2018-05-02 PROCEDURE — 80048 BASIC METABOLIC PNL TOTAL CA: CPT

## 2018-05-02 PROCEDURE — 36415 COLL VENOUS BLD VENIPUNCTURE: CPT

## 2018-05-02 RX ADMIN — PRENATAL VIT W/ FE FUMARATE-FA TAB 27-0.8 MG 1 TABLET: 27-0.8 TAB at 08:05

## 2018-05-02 RX ADMIN — HYDROCODONE BITARTRATE AND ACETAMINOPHEN 1 TABLET: 5; 325 TABLET ORAL at 09:05

## 2018-05-02 RX ADMIN — HYDROCODONE BITARTRATE AND ACETAMINOPHEN 1 TABLET: 5; 325 TABLET ORAL at 12:05

## 2018-05-02 RX ADMIN — PIPERACILLIN AND TAZOBACTAM 4.5 G: 4; .5 INJECTION, POWDER, LYOPHILIZED, FOR SOLUTION INTRAVENOUS; PARENTERAL at 06:05

## 2018-05-02 NOTE — NURSING
Discharge instruction & follow up appoint reviewed with patient , verbalized understanding, no concerns voiced, safety maintained, awaiting [facility] service arrival.

## 2018-05-02 NOTE — PLAN OF CARE
Problem: Patient Care Overview  Goal: Plan of Care Review  Patient AAO x4, can make her needs known, ambulates independently, no nausea or vomiting noted, abdominal pain managed with current pain medications, no concerns voiced, safety maintained.

## 2018-05-02 NOTE — PROGRESS NOTES
TN taught S&S for Anemia home care with pt with teach back:  1. Black stools, 2. Weakness. TN placed education sheet in [employer]..Fanta Ashby RN, BSN, [facility]  5/2/2018    Help at home will be from mother in law, Mago España assisting in pt's recovery.Fanta Ashby RN, BSN, [facility]  5/2/2018    TN reviewed things pt is responsible for when discharged:  To Manage her Care At Home:  1. Getting her prescriptions filled.  2. Taking her medications as directed. DO NOT MISS ANY DOSES!  3. Going to her follow-up doctor appointments.   .Fanta Ashby RN, BSN, [facility]  5/2/2018    TN checked whiteboard for cm/sw name, spectra link #, pt contact, and d/c disposition....Fanta Ashby RN, BSN, [facility]  5/2/2018

## 2018-05-02 NOTE — PLAN OF CARE
TN provided patient with her scheduled appointments and re-informed patient of her responsibilities after discharge to manage her care at home bein. Going to follow up appointments   2. Picking up rx from the pharmacy when discharged  3. Taking her medications as prescribed     Patient's nurse, Mary, informed that patient can discharge from  standpoint once medicaid transportation is arranged.        18 1028   Final Note   Assessment Type Final Discharge Note   Discharge Disposition Home   What phone number can be called within the next 1-3 days to see how you are doing after discharge? ([phone])   Hospital Follow Up  Appt(s) scheduled? Yes   Discharge plans and expectations educations in teach back method with documentation complete? Yes   Right Care Referral Info   Post Acute Recommendation No Care

## 2018-05-02 NOTE — DISCHARGE SUMMARY
[facility]  Discharge Summary     Patient ID:  Kay Berger  [id number]  27 y.o.  1990    Admit date: 4/30/2018    Discharge Date and Time:  05/02/2018 7:18 AM    Admitting Physician: Mehrdad Florence MD     Discharge Provider: Fanta Hudson    Reason for Admission: Right sided abdominal pain [R10.9]  19 weeks gestation of pregnancy [Z3A.19]  Non-intractable vomiting with nausea, unspecified vomiting type [R11.2]    Admission Condition: stable    Procedures Performed: * No surgery found *    Hospital Course admitted for observation with RLQ pain and some guarding on exam. Pain improved overnight and did not worsen with diet. Discharged home with instructions to follow up with OB     Consults: Obstetrics/Gynecology    Final Diagnoses:    Principal Problem: Right sided abdominal pain    Discharged Condition: stable    Discharge Exam:  Gen: awake, resting comfortably in bed  HEENT: NCAT, anicteric  Resp: Normal WOB, symmetric expansion  CV: RR, no murmur  Abd: Soft, NTND, no guarding/rebound, gravis uterus with fundus palpated just inferior to umbilicus  Ext: WWP, no edema  Skin: warm, dry, intact  Neuro: alert, normal speech  Psych: Normal mood, normal affect      Disposition: Home or Self Care    Follow Up/Patient Instructions:     Medications:  Reconciled Home Medications:      Medication List      CONTINUE taking these medications    albuterol 2.5 mg /3 mL (0.083 %) nebulizer solution  Commonly known as:  PROVENTIL  Take 3 mLs (2.5 mg total) by nebulization every 6 (six) hours as needed for Wheezing. Rescue     FLUoxetine 40 MG capsule  Commonly known as:  PROzac  Take 1 capsule (40 mg total) by mouth once daily.     omeprazole 20 MG capsule  Commonly known as:  PriLOSEC  Take 1 capsule (20 mg total) by mouth once daily.     ondansetron 4 MG Tbdl  Commonly known as:  ZOFRAN ODT  Take 1 tablet (4 mg total) by mouth every 6 (six) hours as needed.     prenatal vit 10-iron fum-folic 65-1 mg  Tab  Take 1 tablet by mouth once daily.        STOP taking these medications    doxylamine succinate 25 mg tablet  Commonly known as:  UNISOM (DOXYLAMINE)     marnie (Zingiber officinalis) 550 mg Cap     pyridoxine (vitamin B6) 25 MG Tab  Commonly known as:  B-6            Discharge Procedure Orders  Diet Adult Regular       Follow-up Information     Urszula Price MD On 5/8/2018.    Specialty:  Family Medicine  Why:  On Tuesday @ 1:30pm, outpatient services  Contact information:  [address] [facility]  [place] [id number] [phone]             Dion Reeder MD On 5/7/2018.    Specialty:  Obstetrics and Gynecology  Why:  On Monday @ 9:30am, outpatient services  Contact information:  [address] [facility] [address]  [place] [id number] [phone]                 Activity: activity as tolerated  Diet: regular diet  Wound Care: keep wound clean and dry and none needed    Follow-up with OB as scheduled.    Signed:  Fanta Hudson  5/2/2018  7:18 AM

## 2018-05-02 NOTE — PROGRESS NOTES
11:05-TN spoke with Junior( Anda). [facility] will [facility] within 30min-1hr. Transporter will contact TN when in route.   Confirmation #- [id number]    1:15- TN contacted [facility] [employer] service. Transporter unaware of .    1:17- TN contacted [facility], Spoke with Adina. Adina informed that the  with [employer] is unaware of patient's . Adina contacted the  and provided the  with patient's  location.  should arrive within the hour per Adina. Patient informed.     2:23- TN called [facility] [employer] [facility]. The  is in [facility] and wont arrive back to [place] until 3:15pm.  will call TN when he is in the area.     2:30- BOBBY [employer] Supervisor informed.     3:10-Call received from [employer] driver.  is downstairs to pick patient up. Nurse Mary informed.

## 2018-05-02 NOTE — PLAN OF CARE
Problem: Patient Care Overview  Goal: Plan of Care Review  Outcome: Ongoing (interventions implemented as appropriate)  Absence of falls noted at present. SR up x's2. Fall risk protocol IP. Pt reminded to call for assistance prior to getting OOB as needed. Verbalized understanding. Call bell within reach. Will cont to monitor.

## 2018-05-02 NOTE — PROGRESS NOTES
WRITTEN HEALTHCARE DISCHARGE INFORMATION     Things that YOU are responsible for to Manage Your Care At Home:  1. Getting your prescriptions filled.  2. Taking you medications as directed. DO NOT MISS ANY DOSES!  3. Going to your follow-up doctor appointments. This is important because it allows the doctor to monitor your progress and to determine if any changes need to be made to your treatment plan.    If you are unable to make your follow up appointments, please call the number listed and reschedule this appointment.     After discharge, if you need assistance, you can call [facility] On Call Nurse Care Line for 24/7 assistance at [phone]    Thank you for choosing [facility] and allowing us to care for you.   From your care manager: Ruba POOLRN,TN @ [phone]     You should receive a call from [facility] Discharge Department within 48-72 hours to help manage your care after discharge. Please try to make sure that you answer your phone for this important phone call.     Follow-up Information     Urszula Price MD On 5/8/2018.    Specialty:  Family Medicine  Why:  On Tuesday @ 1:30pm, outpatient services  Contact information:  [address] [place] [address]  [phone]             Dion Reeder MD On 5/7/2018.    Specialty:  Obstetrics and Gynecology  Why:  On Monday @ 9:30am, outpatient services  Contact information:  [address] [facility] [address]  [place] [address]  [phone]

## 2018-05-02 NOTE — NURSING
Patient ambulated downstairs for discharge, did not want to wait for transport, no distress noted, safety maintained.

## 2018-08-16 PROBLEM — R10.9 ABDOMINAL PAIN: Status: ACTIVE | Noted: 2018-08-16

## 2018-08-16 PROBLEM — K62.5 RECTAL BLEEDING: Status: ACTIVE | Noted: 2018-08-16

## 2018-08-26 PROBLEM — O47.9 IRREGULAR CONTRACTIONS: Status: ACTIVE | Noted: 2018-08-26

## 2018-09-14 PROBLEM — Z37.9 NORMAL LABOR: Status: ACTIVE | Noted: 2018-09-14

## 2018-09-14 PROBLEM — O47.9 UTERINE CONTRACTIONS DURING PREGNANCY: Status: ACTIVE | Noted: 2018-09-14

## 2018-11-12 PROBLEM — Z30.2 ADMISSION FOR STERILIZATION: Status: ACTIVE | Noted: 2018-11-12

## 2018-12-17 PROBLEM — Z37.9 NORMAL LABOR: Status: RESOLVED | Noted: 2018-09-14 | Resolved: 2018-12-17

## 2021-05-06 ENCOUNTER — PATIENT MESSAGE (OUTPATIENT)
Dept: RESEARCH | Facility: HOSPITAL | Age: 31
End: 2021-05-06

## 2021-07-01 ENCOUNTER — PATIENT MESSAGE (OUTPATIENT)
Dept: ADMINISTRATIVE | Facility: OTHER | Age: 31
End: 2021-07-01